# Patient Record
Sex: MALE | Race: ASIAN | NOT HISPANIC OR LATINO | Employment: FULL TIME | URBAN - METROPOLITAN AREA
[De-identification: names, ages, dates, MRNs, and addresses within clinical notes are randomized per-mention and may not be internally consistent; named-entity substitution may affect disease eponyms.]

---

## 2023-04-26 ENCOUNTER — OFFICE VISIT (OUTPATIENT)
Dept: URGENT CARE | Facility: URGENT CARE | Age: 23
End: 2023-04-26
Payer: COMMERCIAL

## 2023-04-26 VITALS
TEMPERATURE: 97.3 F | OXYGEN SATURATION: 96 % | DIASTOLIC BLOOD PRESSURE: 85 MMHG | SYSTOLIC BLOOD PRESSURE: 135 MMHG | HEART RATE: 82 BPM | RESPIRATION RATE: 16 BRPM | WEIGHT: 173 LBS

## 2023-04-26 DIAGNOSIS — M77.01 MEDIAL EPICONDYLITIS OF ELBOW, RIGHT: Primary | ICD-10-CM

## 2023-04-26 DIAGNOSIS — M75.21 BICEPS TENDONITIS, RIGHT: ICD-10-CM

## 2023-04-26 PROCEDURE — 99204 OFFICE O/P NEW MOD 45 MIN: CPT | Performed by: NURSE PRACTITIONER

## 2023-04-26 NOTE — PATIENT INSTRUCTIONS
Recommend close follow up with physical therapy for tendonitis and muscle strain of right arm.     You will need to follow up with a primary care provider in 1 week     Please schedule this at  prior to leaving

## 2023-04-26 NOTE — PROGRESS NOTES
"Assessment & Plan      Diagnosis Comments   1. Medial epicondylitis of elbow, right  Physical Therapy Referral       2. Biceps tendonitis, right  Physical Therapy Referral       Ice, rest, tylenol or ibuprofen for discomfort  Home care reviewed. Patient verbalized understanding; will monitor symptoms closely. Reviewed s/e to medications.   Follow up with primary care in 1 week if symptoms not improving.     Handout given from epic and reviewed.  Patient Instructions   Recommend close follow up with physical therapy for tendonitis and muscle strain of right arm.     You will need to follow up with a primary care provider in 1 week     Please schedule this at  prior to leaving              Letter given for work.     GWENDOLYN Baum [facility] JUDY Mayen is a 22 year old male who presents to clinic today for the following health issues:  Chief Complaint   Patient presents with     Arm Pain     Patient reports right forearm pain that began after pulling something at work last week - patient states pain is getting \"a little\" worse. Pain goes towards shoulder. Patient needs note for work as well.     HPI    Patient presents to clinic with one week history of right upper extremity. 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 He is right hand dominant. He likes to fish on his free time.   States pain is right lateral near elbow upper extremity radiates up into inner upper arm near proximal biceps tendon.  Denies numbness or tingling of upper extremity or hand on right side      Review of Systems  Constitutional, HEENT, cardiovascular, pulmonary, gi and gu systems are negative, except as otherwise noted.      Objective    /85 (BP Location: Left arm, Patient Position: Sitting, Cuff Size: Adult Regular)   Pulse 82   Temp 97.3  F (36.3  C) (Tympanic)   Resp 16   Wt 78.5 kg (173 " lb)   SpO2 96%   Physical Exam   GENERAL: healthy, alert and no distress  NECK: no adenopathy, no asymmetry, masses, or scars and thyroid normal to palpation  RESP: lungs clear to auscultation - no rales, rhonchi or wheezes  CV: regular rate and rhythm, normal S1 S2, no S3 or S4, no murmur, click or rub, no peripheral edema and peripheral pulses strong  MS: Mild discomfort with palpation over medial epicondyle area of right upper extremity.  Discomfort radiating up into proximal biceps tendon on right side with lateral motion of upper extremity.  Grasp is equal in strength bilaterally negative Spurling's.  SKIN: no suspicious lesions or rashes

## 2023-04-26 NOTE — LETTER
April 26, 2023      Tiarra Wick  [address] [place] [address]        To Whom It May Concern:    Tiarra Wick  was seen on 4/26/2023. Please excuse him from work using right upper extremity due to tendonitis due to repetitive use of right upper extremity.  Patient will be seen through physical therapy. He will follow up with his primary care provider to have any restrictions re-evaluated.       Sincerely,        GWENDOLYN Baum CNP